# Patient Record
(demographics unavailable — no encounter records)

---

## 2025-02-07 NOTE — DISCUSSION/SUMMARY
[de-identified] : The underlying pathophysiology was reviewed in great detail with the patient as well as the various treatment options, including ice, analgesics, NSAIDs, Physical therapy, steroid injections, MRI, ACL reconstruction surgery  Patient opted for an aspiration today. Patient underwent aspiration to the RIGHT knee was provided today in the office for symptomatic relief. Patient tolerated procedure well.   An MRI of the RIGHT knee was ordered to rule out ACL tear.  Activity modifications and restrictions were discussed. I advised avoiding deep bending, squatting and high intensity activity.  A home exercise sheet was given and discussed with the patient to follow.   I have recommended utilizing a knee sleeve to provide added support and stability.   FU once MRI is obtained.   All questions were answered, all alternatives discussed and the patient is in complete agreement with that plan. Follow-up appointment as instructed. Any issues and the patient will call or come in sooner.

## 2025-02-07 NOTE — PROCEDURE
[de-identified] : PROCEDURE: Aspiration  INDICATION: Effusion to the right knee  At this point I recommended aspiration and under sterile precautions 50 cc bloody fluid was aspirated from the RIGHT knee without complication and after several minutes the patient felt significant relief. An ace bandage wrap was applied. Patient tolerated the procedure well.

## 2025-02-07 NOTE — ADDENDUM
[FreeTextEntry1] : I, Shana Delgadooksanaamy, acted solely as a scribe for Dr. Alexander Meyer on this date 02/07/2025.   All medical record entries made by the Scribe were at my, Dr. Alexander Meyer, direction and personally dictated by me on 02/07/2025. I have reviewed the chart and agree that the record accurately reflects my personal performance of the history, physical exam, assessment and plan. I have also personally directed, reviewed, and agreed with the chart.

## 2025-02-07 NOTE — PHYSICAL EXAM
[Crutches] : ambulates with crutches [Normal RLE] : Right Lower Extremity: No scars, rashes, lesions, ulcers, skin intact [Normal LLE] : Left Lower Extremity: No scars, rashes, lesions, ulcers, skin intact [Normal Touch] : sensation intact for touch [Normal] : no peripheral adenopathy appreciated [de-identified] : Right Lower Extremity o Knee :  Inspection/Palpation : 2+ effusion, mild lateral tenderness, no medial jointline tenderness, no deformity  Range of Motion : 20-60 degrees,   Stability : no valgus or varus instability present on provocative testing, Lachmans Test (2+)  Strength : not assessed due to injury o Muscle Bulk : normal muscle bulk present o Skin : no erythema, no ecchymosis o Sensation : sensation to pin intact o Vascular Exam : no edema, no cyanosis, dorsalis pedis artery pulse 2+, posterior tibial artery pulse 2+ [de-identified] : o RIGHT Knee : AP, lateral, sunrise, and Solano views of the knee were obtained, there are no soft tissue abnormalities, no fractures, alignment is normal, normal appearing joint spaces, normal bone density, no bony lesions.

## 2025-02-12 NOTE — PHYSICAL EXAM
[de-identified] : General appearance: well-nourished and hydrated, pleasant, alert and oriented x 3, cooperative. HEENT: Normocephalic, EOM intact, Nasal septum midline, Oral cavity clear, External auditory canal clear. Cardiovascular: no apparent abnormalities, no lower leg edema, no varicosities, pedal pulses are palpable. Lymphatics Lymph nodes: none palpated, Lymphedema: not present. Neurologic: sensation is normal, no muscle weakness in upper or lower extremities, patella tendon reflexes intact. Dermatologic no apparent skin lesions, moist, warm, no rash. Spine: cervical spine appears normal and moves freely, thoracic spine appears normal and moves freely, lumbosacral spine appears normal and moves freely. Gait: right antalgic.   Right knee Inspection: moderate effusion. Wounds: none. Alignment: normal. Palpation: no specific tenderness on palpation. ROM active (in degrees): 20 degree flexion contracture, 20-80 with pain and crepitus Ligamentous laxity: all ligaments appear stable,, positive ant. drawer test, negative post. drawer test, stable to varus stress test, stable to valgus stress test. positive Lachman's test, negative pivot shift test, mild medial lateral laxity  Meniscal Test: negative McMurrays, negative Samina. Patellofemoral Alignment Test: Q angle-, normal. Muscle Test: good quad strength. Leg examination: calf is soft and non-tender. AV malformation on medial aspect of thigh   Left knee Inspection: no effusion or erythema. Wounds: none. Alignment: normal. Palpation: no specific tenderness on palpation. ROM active (in degrees): 0-145 Ligamentous laxity: all ligaments appear stable, negative ant. drawer test, negative post. drawer test, stable to varus stress test, stable to valgus stress test. negative Lachman's test, negative pivot shift test Meniscal Test: negative McMurrays, negative Samina. Patellofemoral Alignment Test: Q angle-, normal. Muscle Test: good quad strength. Leg examination: calf soft and non tender.   Left hip Inspection: No swelling or ecchymosis. Wounds: none. Palpation: non-tender. Stability: no instability. Strength: 5/5 all motor groups. ROM: no pain with FROM. Leg length: equal.   Right hip Inspection: No swelling or ecchymosis. Wounds: none. Palpation: non-tender. Stability: no instability. Strength: 5/5 all motor groups. ROM: no pain with FROM. Leg length: equal.   Left ankle Inspection: no erythema noted, no swelling noted. Palpation: no pain on palpation. ROM: FROM without crepitus. Muscle strength: 5/5. Stability: no instability noted.   Right ankle Inspection: no erythema noted, no swelling noted. ROM: FROM without crepitus. Palpation: no pain on palpation. Muscle strength: 5/5. Stability: no instability noted.   Left foot Inspection: color, texture and turgor are normal. ROM: full range of motion of all joints without pain or crepitus. Palpation: no tenderness. Stability: no instability noted.   Right foot Inspection: color, texture and turgor are normal. ROM: full range of motion of all joints without pain or crepitus. Palpation: no tenderness. Stability: no instability noted.   Left shoulder Inspection: no muscle asymmetry, no atrophy. Palpation: no tenderness noted, ACJ non-tender. ROM: full active ROM, full passive ROM. Strength testing): anterior deltoid, supraspinatus, infraspinatus, subscapularis all 5/5. Stability test: ant. apprehension negative, post. apprehension negative, relocation test negative. Impingement Test: negative NEER.   Right shoulder Inspection: no muscle asymmetry, no atrophy. Palpation: no tenderness noted, ACJ non-tender. ROM: full active ROM, full passive ROM. Strength testing): anterior deltoid, supraspinatus, infraspinatus, subscapularis all 5/5. Stability test: ant. apprehension negative, post. apprehension negative, relocation test negative. Impingement Test: negative NEER. Surgical Wounds: none.   Left elbow Inspection: negative swelling. Wounds: none. Palpation: non-tender. ROM: full ROM. Strength: 5/5 all groups. Stability: no instability. Mass: none.   Right elbow Inspection: negative swelling. Wounds: none. Palpation: non-tender. ROM: full ROM. Strength: 5/5 all groups. Stability: no instability. Mass: none.   Left wrist Inspection: negative swelling. Wound: none. Palpation (bone): no tenderness. ROM: full ROM. Strength: full , good.   Right wrist Inspection: negative swelling. Wound: none. Palpation (bone): no tenderness. ROM: full ROM. Strength: full , good.   Left hand Inspection: no skin changes, normal appearance. Wounds: none. Strength: full , able to make full fist. Sensation: light touch intact all fingers and thumb. Vascular: good capillary refill < 3 seconds, all fingers and thumb. Mass: none.   Right hand Inspection: no skin changes, normal appearance. Wounds: none. Strength: full , able to make full fist. Sensation: light touch intact all fingers and thumb. Vascular: good capillary refill < 3 seconds, all fingers and thumb. Mass: none. [de-identified] : X-rays right knee taken 2/7/2025 brought in by patient shows normal alignment, joint space maintained and a well centered patella.   MRI right knee taken 2/10/2025: films brought in and reviewed, see attached report. Findings consisted with torn ACL, bone lateral tibial plateau, bony edema medial tibial plateau and bruising of posterior horn of meniscus. Bony edema noted.

## 2025-02-12 NOTE — DISCUSSION/SUMMARY
[de-identified] : Patient presents with a right knee acute ACL tear, MCL sprain and associated bone bruise. We discussed at length the nature of the patient's condition.  I did review MRI films with them. I believe he will eventually need an ACL reconstruction, but he does have limited ROM. In the interim, I suggested he start PT, hinge knee brace and crutches for ambulation. I did explain that he will need to regain him ROM before surgery could be considered. I referred him to Dr. Celeste for a surgical evaluation.   I will see them back on an as needed basis.

## 2025-02-12 NOTE — HISTORY OF PRESENT ILLNESS
[de-identified] : NOLAN SWENSON 44 year old male presents for evaluation of right knee. 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 He tried to stand up but unable to so due to the pain and instability. He saw a sports medicine doctor who gave him a Hinge brace and sent him for an MRI. He is having continued diffused pain in the knee. Patient notes swelling, feelings of instability and painful ROM. His walking is severely limited due to pain and stairs. He doesn't take oral pain medications regularly. He states he took Tylenol once or twice. 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 Otherwise, his right knee symptoms in the past. He denies any major medical problems. He has allergies to AVELOX (hives). He does have physically demanding job and does like to participate in high energy activities including sports.

## 2025-02-12 NOTE — ADDENDUM
[FreeTextEntry1] : This note was written by Temo Mckinley on 02/12/2025 acting as scribe for Dr. Magdi Burnette M.D.   I, Dr. Magdi Burnette, have read and attest that all the information, medical decision making and discharge instructions within are true and accurate.

## 2025-02-24 NOTE — DATA REVIEWED
[MRI] : MRI [Right] : of the right [Knee] : knee [Report was reviewed and noted in the chart] : The report was reviewed and noted in the chart [I independently reviewed and interpreted images and report] : I independently reviewed and interpreted images and report [I reviewed the films/CD and additionally noted] : I reviewed the films/CD and additionally noted [FreeTextEntry1] : MRI right knee reveals evidence of complete anterior cruciate ligament tearing with associated bone contusions, small posterior radial medial meniscal tearing, complex lateral meniscus tear, anterolateral ligament sprain

## 2025-02-24 NOTE — HISTORY OF PRESENT ILLNESS
[de-identified] : The patient is a 44 year  old right hand dominant male who presents today complaining of right knee pain.  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 This is NOT an athletic injury occurring associated with an interscholastic or organized sports team. Quality of symptoms: instability, buckling, swollen  Improves with: rest Worse with: stairs  Prior treatment: Aspiration Dr. 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

## 2025-02-24 NOTE — IMAGING
[de-identified] : The patient is a well appearing 44-year-old male of their stated age. Patient ambulates in soft knee brace. Negative straight leg raise bilateral.   Effected Knee: RIGHT  ROM:  0-130 degrees Lachman: POSITIVE  Pivot Shift: POSITIVE  Anterior Drawer: POSITIVE  Posterior Drawer / Sag: Negative Varus Stress 0 degrees: Stable Varus Stress 30 degrees: Stable Valgus Stress 0 degrees: Stable Valgus Stress 30 degrees: Stable Medial Wolf: POSITIVE  Lateral Wolf: POSITIVE  Patella Glide: 2+ Patella Apprehension: Negative Patella Grind: Negative [place] Valgus: Negative Pes Cavus: Negative   Palpation: Medial Joint Line: TENDER  Lateral Joint Line: TENDER  Medial Collateral Ligament: Nontender Lateral Collateral Ligament/PLC: Nontender Distal Femur: Nontender Proximal Tibia: Nontender Tibial Tubercle: Nontender Gerdy's Tubercle: Nontender Distal Pole Patella: Nontender Quadriceps Tendon: Nontender & Intact Patella Tendon: Nontender & Intact Medial Femoral Condyle: Nontender Medial Distal Hamstring/PES: Nontender Lateral Distal Hamstring: Nontender & Stable Iliotibial Band: Nontender Medial Patellofemoral Ligament: Nontender Adductor: Nontender Proximal GSC-Plantaris: Nontender Calf: Supple & Nontender   Inspection: Deformity: No Erythema: No Ecchymosis: No Abrasions: No Effusion: MILD  Prepatellar Bursitis: No Neurologic Exam: Sensation L4-S1: Grossly Intact Motor Exam: Quadriceps: 4 out of 5 Hamstrings: 5 out of 5 EHL: 5 out of 5 FHL: 5 out of 5 TA: 5 out of 5 GS: 5 out of 5 Circulatory/Pulses: Dorsalis Pedis: 2+ Posterior Tibialis: 2+ Additional Pertinent Findings: None   Contralateral Knee:                           ROM: 0-145 degrees Other Pertinent Findings: None   Assessment: The patient is a 44 year old male with right knee pain and radiographic and physical exam findings consistent with ACL tear, ALL sprain, medial and lateral meniscus tears, possible medial root. The patient's condition is acute. Documents/Results Reviewed Today: MRI right knee  Tests/Studies Independently Interpreted Today: MRI right knee reveals evidence of complete anterior cruciate ligament tearing with associated bone contusions, small posterior radial medial meniscal tearing, complex lateral meniscus tear, anterolateral ligament sprain.  Pertinent findings include: 0-130, mild effusion, 4/5 quad strength, LJLT, MJLT, +medial wolf, +lateral wolf, +LM/PS/AD, stable collateral ligaments.  Confounding medical conditions/concerns: vascular malformation right inner thigh - two sclerotherapy surgeries at \[facility]\"" last in 2023.    Plan: Discussed operative vs non-operative treatment options including right knee arthroscopic anterior cruciate ligament reconstruction with quadriceps tendon autograft with internal brace, medial and lateral meniscal repairs vs meniscectomy, anterolateral ligament repair, and any indicated procedures. Recommended the patient proceed with the suggested surgical procedure to improve the functionality of the affected joint. All questions and concerns regarding the surgery were addressed. Went over the recovery timeline and expected outcomes following surgery. The patient continues to be symptomatic and has failed conservative treatment, deciding to move forward with surgical intervention. The patient will consult his vascular surgeon regarding use of a tourniquet given his history of vascular malformation right inner thigh.  Tests Ordered: Pre-op Prescription Medications Ordered: Discussed appropriate use of OTC anti-inflammatories and analgesics (including but not limited to Aleve, Advil, Tylenol, Motrin, Ibuprofen, Voltaren gel, etc.) Braces/DME Ordered: XROM, reparel, and cold therapy unit  Activity/Work/Sports Status: No dynamic pivoting and cutting  Additional Instructions: None Follow-Up: 2 weeks post-op  The patient's current medication management of their orthopedic diagnosis was reviewed today: The patient declined and/or was contraindicated for the recommended prescription medication Naprosyn and will use over the counter Advil, Aleve, Voltaren Gel or Tylenol as directed. (1) We discussed a comprehensive treatment plan that included possible pharmaceutical management involving the use of prescription strength medications including but not limited to options such as oral Naprosyn 500mg BID, once daily Meloxicam 15 mg, or 500-650 mg Tylenol versus over the counter oral medications and topical prescription NSAID Pennsaid vs over the counter Voltaren gel.  Based on our extensive discussion, the patient declined prescription medication and will use over the counter Advil, Alleve, Voltaren Gel or Tylenol as directed. (2) There is a moderate risk of morbidity with further treatment, especially from use of prescription strength medications and possible side effects of these medications which include upset stomach with oral medications, skin reactions to topical medications and cardiac/renal issues with long term use. (3) I recommended that the patient follow-up with their medical physician to discuss any significant specific potential issues with long term medication use such as interactions with current medications or with exacerbation of underlying medical comorbidities. (4) The benefits and risks associated with use of injectable, oral or topical, prescription and over the counter anti-inflammatory medications were discussed with the patient. The patient voiced understanding of the risks including but not limited to bleeding, stroke, kidney dysfunction, heart disease, and were referred to the black box warning label for further information.   Consent:  Conservative treatment, nontreatment, nonsurgical intervention and surgical intervention treatment options have been reviewed with the patient.  The patient continues to be symptomatic and has failed conservative treatment, and elects to move forward with surgical intervention.  The patient is indicated for right knee arthroscopic assisted anterior cruciate ligament reconstruction with quadriceps tendon autograft with internal brace, anterolateral ligament repair, medial and lateral meniscus repair vs meniscectomy and all indicated procedures. As such the alternatives, benefits and risks, of the above procedure, including but not limited to bleeding, infection, neurovascular injury, loss of limb, loss of life,  DVT, PE, RSD, inability to return to previous level of activity, inability to return to previous level of employment, advancement of or to osteoarthritic changes, joint instability or motion loss, hardware failure or migration, failure to resolve all symptoms, failure to return to sports and need for further procedures, as well as specific risk of meniscus repair failure, anterior knee pain and patella fracture, and need for future joint arthroplasty were discussed with the patient and/or their legal guardian who agreed to move forward with surgical intervention.  They have reviewed and signed the consent form today after expressing understanding of the above documented conversation. The patient or their representative will contact my office as instructed on the preoperative instruction sheet they received today to schedule surgery in a timely manner as discussed.   IMarlyn attest that this documentation has been prepared under the direction and in the presence of Provider Dr. Jerardo Amezquita.   The documentation recorded by the scribe accurately reflects the services IDr. Jerardo, personally performed and the decisions made by me.

## 2025-02-25 NOTE — REVIEW OF SYSTEMS
Goal Outcome Evaluation:    Plan of Care Reviewed With: patient    Overall Patient Progress: no change    Outcome Evaluation: Pt has been sleeping /resting well tonight until around 0530 when pt called and RN came to his room. He requested to empty his urinal and to get him ice water which RN did for him. When asked about his pain, he told RN not to worry about it. Before RN left his room, pt requested to leave his door half way open. After 30 minutes, this RN was informed by 2 other RNs that pt was very upset and started to yell at them. Support staff had told them that pt needs help with WIFI so they went to the pt;s room and had woken up the pt which makes him very mad since he claimed not needing anything and yet he was woken up from sleep. The Support staff then admitted giving the wrong room number to the 2 other RNs which she apologized for but pt  remained upset d/t multiple staff  and care issues he already had. Pt remained calm and pleasant with this RN during encounters but also not hiding his frustration to cares received in the unit.Pt has no c/o SOB and no s/s of respiratory issue noted at RA. Appear to be sleeping/resting between cares/ meds. Continue with plan of care.    Patient's most recent vital signs are:     Vital signs:  BP: 121/54  Temp: 98.3  HR: 66  RR: 17  SpO2: 94 %     Patient does not have new respiratory symptoms.  Patient does not have new sore throat.  Patient does not have a fever greater than 99.5.             [Joint Pain] : joint pain [Negative] : Heme/Lymph

## 2025-03-03 NOTE — HISTORY OF PRESENT ILLNESS
[FreeTextEntry1] : Medical clearance [de-identified] : Patient encounter today for medical clearance requested by  for right knee arthroscopy scheduled on 03/04/2025 at [facility]. States they have been doing well.  Denies any CP, SOB or diff breathing.  No recent fever, chills, cough or cold type symptoms. No recent LOC or head trauma. Denies a history of seizure disorder. Denies lose bridges, caps, or dentures. Denies complications with anesthesia in the past. Patient had a physical within the last year.  Has no other complaints at this time.

## 2025-03-03 NOTE — HISTORY OF PRESENT ILLNESS
[FreeTextEntry1] : Medical clearance [de-identified] : Patient encounter today for medical clearance requested by  for right knee arthroscopy scheduled on 03/04/2025 at [facility]. States they have been doing well.  Denies any CP, SOB or diff breathing.  No recent fever, chills, cough or cold type symptoms. No recent LOC or head trauma. Denies a history of seizure disorder. Denies lose bridges, caps, or dentures. Denies complications with anesthesia in the past. Patient had a physical within the last year.  Has no other complaints at this time.

## 2025-03-03 NOTE — PLAN
[FreeTextEntry1] : Labs Drawn by Dr. Anand Baez due to poor venous access.  Patient required lab testing due to conditions in their past medical history requiring periodic monitoring.  Labs were sent to [facility].  EKG - NSR - 70, MARITZA - 0.142, No acute T wave changes noted..  Labs reviewed  Patient to continue with present medications - all medications reconciled/reviewed during this visit and listed above. Increase fluids  Patient is medically stable and may proceed with proposed procedure.  This clearance has been forwarded to Dr. Amezquita and his office has been contacted providing medical clearance and all associated documents for this patient.  At least  45 minutes was spent with patient face to face examining and reviewing findings/results during this visit. Ample time was provided to answer any questions or address concerns to the patients satisfaction..   I, Martha Baker, attest that this documentation has been prepared under the direction and in the presence of Provider Anand Baez DNP  The documentation recorded by the scribe, in my presence, accurately reflects the service I personally performed, and the decisions made by me with my edits as appropriate. Anand Baez DNP

## 2025-03-03 NOTE — PLAN
[FreeTextEntry1] : Labs Drawn by Dr. Anand Baez due to poor venous access.  Patient required lab testing due to conditions in their past medical history requiring periodic monitoring.  Labs were sent to [facility].  EKG - NSR - 70, MARITZA - 0.142, No acute T wave changes noted..  Labs reviewed  Patient to continue with present medications - all medications reconciled/reviewed during this visit and listed above. Increase fluids  Patient is medically stable and may proceed with proposed procedure.  This clearance has been forwarded to Dr. Amezquita and his office has been contacted providing medical clearance and all associated documents for this patient.  At least  45 minutes was spent with patient face to face examining and reviewing findings/results during this visit. Ample time was provided to answer any questions or address concerns to the patients satisfaction..   I, Martha Baker, attest that this documentation has been prepared under the direction and in the presence of Provider Anand Baez DNP  The documentation recorded by the scribe, in my presence, accurately reflects the service I personally performed, and the decisions made by me with my edits as appropriate. Anand Baez DNP

## 2025-03-18 NOTE — PHYSICAL EXAM
[de-identified] : The patient is a well appearing 44 year old male of their stated age. Patient ambulates in XROM with assistance of crutches. Negative straight leg raise bilateral.   Surgical site: Right knee    Incision sites: Well approximated, clean, dry, intact, without drainage, without erythema   Range of motion: 0-80 degrees    Motor Testing: Quad firing well, able to perform SLR.    Stability Testing: Limited by pain   Swelling/Effusion/Ecchymosis: Mild effusion   Tenderness to palpation: None   Provocative testing: Limited by pain   Right Calf: soft and nontender Left Calf: soft and nontender   Neurovascular Examination: Grossly intact, 2+ distal pulses Contralateral Extremity: Examination grossly benign   Assessment & Plan: The patient is approximately 2 weeks s/p right knee EUA arthroscopic anterior cruciate ligament reconstruction with quadriceps autograft with internal brace, anterolateral ligament repair, partial lateral meniscectomy, synovectomy and excision of plica (DOS: 3/4/25). Sutures removed and Steri Strips applied today. The patient is instructed on wound management. Reviewed and discussed operative images with patient in office today.  The patient's post-op plan, protocol and activity modifications have been thoroughly discussed and the patient expressed understanding. Patient will continue use of brace as discussed, locked in extension opening as per protocol. Continue physical therapy. The patient will control pain as discussed & continue ice and elevation as needed. The patient otherwise may advance activity as discussed. Follow up 4 weeks.   The patient's current medication management of their orthopedic diagnosis was reviewed today: The patient declined and/or was contraindicated for the recommended prescription medication Naprosyn and will use over the counter Advil, Aleve, Voltaren Gel or Tylenol as directed. (1) We discussed a comprehensive treatment plan that included possible pharmaceutical management involving the use of prescription strength medications including but not limited to options such as oral Naprosyn 500mg BID, once daily Meloxicam 15 mg, or 500-650 mg Tylenol versus over the counter oral medications and topical prescription NSAID Pennsaid vs over the counter Voltaren gel.  Based on our extensive discussion, the patient declined prescription medication and will use over the counter Advil, Alleve, Voltaren Gel or Tylenol as directed. (2) There is a moderate risk of morbidity with further treatment, especially from use of prescription strength medications and possible side effects of these medications which include upset stomach with oral medications, skin reactions to topical medications and cardiac/renal issues with long term use. (3) I recommended that the patient follow-up with their medical physician to discuss any significant specific potential issues with long term medication use such as interactions with current medications or with exacerbation of underlying medical comorbidities. (4) The benefits and risks associated with use of injectable, oral or topical, prescription and over the counter anti-inflammatory medications were discussed with the patient. The patient voiced understanding of the risks including but not limited to bleeding, stroke, kidney dysfunction, heart disease, and were referred to the black box warning label for further information.   Marlyn CAMARGO attest that this documentation has been prepared under the direction and in the presence of Provider Dr. Jerardo Amezquita.   The documentation recorded by the scribe accurately reflects the service Bel CAMARGO PA-C, personally performed and the decisions made by me. The patient was seen by me under the direct supervision of Dr. Jerardo Amezquita.

## 2025-03-18 NOTE — HISTORY OF PRESENT ILLNESS
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 This is NOT an athletic injury occurring associated with an interscholastic or organized sports team. 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 Denies fever/chills/nausea after surgery. Compliant with xrom. In PT 2x/week and doing HEP. Doing well postoperatively.  Additional Information: vascular malformation right inner thigh - two sclerotherapy surgeries at [facility] last one 2023

## 2025-03-18 NOTE — HISTORY OF PRESENT ILLNESS
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 This is NOT an athletic injury occurring associated with an interscholastic or organized sports team. 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 Denies fever/chills/nausea after surgery. Compliant with xrom. In PT 2x/week and doing HEP. Doing well postoperatively.  Additional Information: vascular malformation right inner thigh - two sclerotherapy surgeries at \[facility]\"" last one 2023

## 2025-03-18 NOTE — PHYSICAL EXAM
[de-identified] : The patient is a well appearing 44 year old male of their stated age. Patient ambulates in XROM with assistance of crutches. Negative straight leg raise bilateral.   Surgical site: Right knee    Incision sites: Well approximated, clean, dry, intact, without drainage, without erythema   Range of motion: 0-80 degrees    Motor Testing: Quad firing well, able to perform SLR.    Stability Testing: Limited by pain   Swelling/Effusion/Ecchymosis: Mild effusion   Tenderness to palpation: None   Provocative testing: Limited by pain   Right Calf: soft and nontender Left Calf: soft and nontender   Neurovascular Examination: Grossly intact, 2+ distal pulses Contralateral Extremity: Examination grossly benign   Assessment & Plan: The patient is approximately 2 weeks s/p right knee EUA arthroscopic anterior cruciate ligament reconstruction with quadriceps autograft with internal brace, anterolateral ligament repair, partial lateral meniscectomy, synovectomy and excision of plica (DOS: 3/4/25). Sutures removed and Steri Strips applied today. The patient is instructed on wound management. Reviewed and discussed operative images with patient in office today.  The patient's post-op plan, protocol and activity modifications have been thoroughly discussed and the patient expressed understanding. Patient will continue use of brace as discussed, locked in extension opening as per protocol. Continue physical therapy. The patient will control pain as discussed & continue ice and elevation as needed. The patient otherwise may advance activity as discussed. Follow up 4 weeks.   The patient's current medication management of their orthopedic diagnosis was reviewed today: The patient declined and/or was contraindicated for the recommended prescription medication Naprosyn and will use over the counter Advil, Aleve, Voltaren Gel or Tylenol as directed. (1) We discussed a comprehensive treatment plan that included possible pharmaceutical management involving the use of prescription strength medications including but not limited to options such as oral Naprosyn 500mg BID, once daily Meloxicam 15 mg, or 500-650 mg Tylenol versus over the counter oral medications and topical prescription NSAID Pennsaid vs over the counter Voltaren gel.  Based on our extensive discussion, the patient declined prescription medication and will use over the counter Advil, Alleve, Voltaren Gel or Tylenol as directed. (2) There is a moderate risk of morbidity with further treatment, especially from use of prescription strength medications and possible side effects of these medications which include upset stomach with oral medications, skin reactions to topical medications and cardiac/renal issues with long term use. (3) I recommended that the patient follow-up with their medical physician to discuss any significant specific potential issues with long term medication use such as interactions with current medications or with exacerbation of underlying medical comorbidities. (4) The benefits and risks associated with use of injectable, oral or topical, prescription and over the counter anti-inflammatory medications were discussed with the patient. The patient voiced understanding of the risks including but not limited to bleeding, stroke, kidney dysfunction, heart disease, and were referred to the black box warning label for further information.   Marlyn CAMARGO attest that this documentation has been prepared under the direction and in the presence of Provider Dr. Jerardo Amezquita.   The documentation recorded by the scribe accurately reflects the service Bel CAMARGO PA-C, personally performed and the decisions made by me. The patient was seen by me under the direct supervision of Dr. Jerardo Amezquita.

## 2025-04-15 NOTE — HISTORY OF PRESENT ILLNESS
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 This is NOT an athletic injury occurring associated with an interscholastic or organized sports team. Treatment/Imaging/Studies Since Last Visit: PT 2x/week 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 Getting to about 95 degrees of knee flexion. Unlocked his brace for walking on thursday 4/10. Quad strength is improving. No pain with activity, soreness after PT. Additional Information: vascular malformation right inner thigh - two sclerotherapy surgeries [facility] last one 2023

## 2025-04-15 NOTE — PHYSICAL EXAM
[de-identified] : The patient is a well appearing 44 year old male of their stated age. Patient ambulates in XROM with assistance of crutches. Negative straight leg raise bilateral.   Surgical site: Right knee    Incision sites: Well Healed   Range of motion: 3-90 degrees    Motor Testin/5 Quadriceps   Stability Testing: Limited by pain   Swelling/Effusion/Ecchymosis: Mild effusion   Tenderness to palpation: None   Provocative testing: Limited by pain   Right Calf: soft and nontender Left Calf: soft and nontender   Neurovascular Examination: Grossly intact, 2+ distal pulses Contralateral Extremity: Examination grossly benign   Assessment & Plan: The patient is approximately 6 weeks s/p right knee EUA arthroscopic anterior cruciate ligament reconstruction with quadriceps autograft with internal brace, anterolateral ligament repair, partial lateral meniscectomy, synovectomy and excision of plica (DOS: 3/4/25). The patient is instructed on wound management.  The patient's post-op plan, protocol and activity modifications have been thoroughly discussed and the patient expressed understanding. Patient may discontinue use of brace - unless in heavily populated environments or bad weather conditions. The patient presents with limited flexion. The patient is prescribed a Flexion Dynasplint Knee Brace today as an adjunct to formal physical therapy due to a delay in post-operative flexion in the involved knee. Continue physical therapy working on pushing ROM. Discussed putting something under his heel to help gain full extension. The patient will control pain as discussed & continue ice and elevation as needed. The patient otherwise may advance activity as discussed. Recommended mobilizing patella and instructed patient how to do patella mobilization. Follow up 6 weeks.    Letter of Medical Necessity for Flexion Dynasplint Knee Brace The patient is prescribed a Flexion Dynasplint Knee Brace today as an adjunct to formal physical therapy due to a delay in post-operative flexion in the involved knee. At this time, the patient presents with documented signs and symptoms of significant motion loss following surgical procedure. This brace is indicated to increase range of motion of the joint by applying steady, gentle force to the joint over a prolonged period of time and to avoid any manipulation under anesthesia.   The patient's current medication management of their orthopedic diagnosis was reviewed today: The patient declined and/or was contraindicated for the recommended prescription medication Naprosyn and will use over the counter Advil, Aleve, Voltaren Gel or Tylenol as directed. (1) We discussed a comprehensive treatment plan that included possible pharmaceutical management involving the use of prescription strength medications including but not limited to options such as oral Naprosyn 500mg BID, once daily Meloxicam 15 mg, or 500-650 mg Tylenol versus over the counter oral medications and topical prescription NSAID Pennsaid vs over the counter Voltaren gel.  Based on our extensive discussion, the patient declined prescription medication and will use over the counter Advil, Alleve, Voltaren Gel or Tylenol as directed. (2) There is a moderate risk of morbidity with further treatment, especially from use of prescription strength medications and possible side effects of these medications which include upset stomach with oral medications, skin reactions to topical medications and cardiac/renal issues with long term use. (3) I recommended that the patient follow-up with their medical physician to discuss any significant specific potential issues with long term medication use such as interactions with current medications or with exacerbation of underlying medical comorbidities. (4) The benefits and risks associated with use of injectable, oral or topical, prescription and over the counter anti-inflammatory medications were discussed with the patient. The patient voiced understanding of the risks including but not limited to bleeding, stroke, kidney dysfunction, heart disease, and were referred to the black box warning label for further information.   Lynne CAMARGO attest that this documentation has been prepared under the direction and in the presence of Provider Dr. Jerardo Amezquita.  The documentation recorded by the scribe accurately reflects the services IDr. Jerardo, personally performed and the decisions made by me.

## 2025-04-15 NOTE — PHYSICAL EXAM
[de-identified] : The patient is a well appearing 44 year old male of their stated age. Patient ambulates in XROM with assistance of crutches. Negative straight leg raise bilateral.   Surgical site: Right knee    Incision sites: Well Healed   Range of motion: 3-90 degrees    Motor Testin/5 Quadriceps   Stability Testing: Limited by pain   Swelling/Effusion/Ecchymosis: Mild effusion   Tenderness to palpation: None   Provocative testing: Limited by pain   Right Calf: soft and nontender Left Calf: soft and nontender   Neurovascular Examination: Grossly intact, 2+ distal pulses Contralateral Extremity: Examination grossly benign   Assessment & Plan: The patient is approximately 6 weeks s/p right knee EUA arthroscopic anterior cruciate ligament reconstruction with quadriceps autograft with internal brace, anterolateral ligament repair, partial lateral meniscectomy, synovectomy and excision of plica (DOS: 3/4/25). The patient is instructed on wound management.  The patient's post-op plan, protocol and activity modifications have been thoroughly discussed and the patient expressed understanding. Patient may discontinue use of brace - unless in heavily populated environments or bad weather conditions. The patient presents with limited flexion. The patient is prescribed a Flexion Dynasplint Knee Brace today as an adjunct to formal physical therapy due to a delay in post-operative flexion in the involved knee. Continue physical therapy working on pushing ROM. Discussed putting something under his heel to help gain full extension. The patient will control pain as discussed & continue ice and elevation as needed. The patient otherwise may advance activity as discussed. Recommended mobilizing patella and instructed patient how to do patella mobilization. Follow up 6 weeks.    Letter of Medical Necessity for Flexion Dynasplint Knee Brace The patient is prescribed a Flexion Dynasplint Knee Brace today as an adjunct to formal physical therapy due to a delay in post-operative flexion in the involved knee. At this time, the patient presents with documented signs and symptoms of significant motion loss following surgical procedure. This brace is indicated to increase range of motion of the joint by applying steady, gentle force to the joint over a prolonged period of time and to avoid any manipulation under anesthesia.   The patient's current medication management of their orthopedic diagnosis was reviewed today: The patient declined and/or was contraindicated for the recommended prescription medication Naprosyn and will use over the counter Advil, Aleve, Voltaren Gel or Tylenol as directed. (1) We discussed a comprehensive treatment plan that included possible pharmaceutical management involving the use of prescription strength medications including but not limited to options such as oral Naprosyn 500mg BID, once daily Meloxicam 15 mg, or 500-650 mg Tylenol versus over the counter oral medications and topical prescription NSAID Pennsaid vs over the counter Voltaren gel.  Based on our extensive discussion, the patient declined prescription medication and will use over the counter Advil, Alleve, Voltaren Gel or Tylenol as directed. (2) There is a moderate risk of morbidity with further treatment, especially from use of prescription strength medications and possible side effects of these medications which include upset stomach with oral medications, skin reactions to topical medications and cardiac/renal issues with long term use. (3) I recommended that the patient follow-up with their medical physician to discuss any significant specific potential issues with long term medication use such as interactions with current medications or with exacerbation of underlying medical comorbidities. (4) The benefits and risks associated with use of injectable, oral or topical, prescription and over the counter anti-inflammatory medications were discussed with the patient. The patient voiced understanding of the risks including but not limited to bleeding, stroke, kidney dysfunction, heart disease, and were referred to the black box warning label for further information.   Lynne CAMARGO attest that this documentation has been prepared under the direction and in the presence of Provider Dr. Jerardo Amezquita.  The documentation recorded by the scribe accurately reflects the services IDr. Jerardo, personally performed and the decisions made by me.

## 2025-04-15 NOTE — HISTORY OF PRESENT ILLNESS
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 This is NOT an athletic injury occurring associated with an interscholastic or organized sports team. 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 Getting to about 95 degrees of knee flexion. Unlocked his brace for walking on thursday 4/10. Quad strength is improving. No pain with activity, soreness after PT. Additional Information: vascular malformation right inner thigh - two sclerotherapy surgeries at [facility] last one 2023

## 2025-05-13 NOTE — HISTORY OF PRESENT ILLNESS
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 This is NOT an athletic injury occurring associated with an interscholastic or organized sports team. 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 Is having difficulty walking without a limp. Working on getting a normal gait in PT. Experiences some tightness discomfort in his anterior knee. Additional Information: vascular malformation right inner thigh - two sclerotherapy surgeries at [facility] last one 2023

## 2025-06-10 NOTE — PHYSICAL EXAM
[de-identified] : The patient is a well appearing 44-year-old male of their stated age. Patient ambulates with normal gait Negative straight leg raise bilateral.   Surgical site: Right knee    Incision sites: Well Healed   Range of motion: 0-140 degrees    Motor Testin+/5 Quadriceps   Stability Testing: Slight varus and valgus jog    Swelling/Effusion/Ecchymosis: None   Tenderness to palpation: None   Provocative testing: -LM/AD   Right Calf: soft and nontender Left Calf: soft and nontender   Neurovascular Examination: Grossly intact, 2+ distal pulses Contralateral Extremity: Examination grossly benign   Assessment & Plan: The patient is approximately 14 weeks s/p right knee EUA arthroscopic anterior cruciate ligament reconstruction with quadriceps autograft with internal brace, anterolateral ligament repair, partial lateral meniscectomy, synovectomy and excision of plica (DOS: 3/4/25). The patient's post-op plan, protocol and activity modifications have been thoroughly discussed and the patient expressed understanding. Continue physical therapy working on strengthening. The patient has progressed well over the past 6 weeks working on ROM and strengthening. The patient will control pain as discussed & continue ice and elevation as needed. The patient otherwise may advance activity as discussed. The patient is prescribed a custom [employer] ACL short calf [place] [employer] knee brace today for return to activities of daily living. The patient will start to advance with straight line activity on flat, stable ground. The patient will avoid any dynamic pivoting or cutting and continue to work on strengthening.  The patient is allowed to return to work on 6/10/25- note given today. Discussed with patient being cognizant at work and wearing the custom hinged brace while at work until he gets the custom brace  Follow up 6 weeks.  Letter of Medical Necessity for Custom Knee Brace   The patient is prescribed a custom [employer] ACL short calf [place] Pro knee brace today for return to activities of daily living. This brace is indicated to protect the surgically repaired knee due to instability during the continued ligamentous healing process, and while the patient increases their activities of daily living. At this point the patient presents with sustained muscle atrophy, proprioceptive deficiency, instability and motor imbalance in their involved knee. The custom nature of the brace is required to match the natural contours of this patient's thigh to calf ratio which would not fit into an off the shelf model due to surgical changes and thigh atrophy.  The patient's current medication management of their orthopedic diagnosis was reviewed today: The patient declined and/or was contraindicated for the recommended prescription medication Naprosyn and will use over the counter Advil, Aleve, Voltaren Gel or Tylenol as directed. (1) We discussed a comprehensive treatment plan that included possible pharmaceutical management involving the use of prescription strength medications including but not limited to options such as oral Naprosyn 500mg BID, once daily Meloxicam 15 mg, or 500-650 mg Tylenol versus over-the-counter oral medications and topical prescription NSAID Pennsaid vs over the counter Voltaren gel.  Based on our extensive discussion, the patient declined prescription medication and will use over the counter Advil, Aleve, Voltaren Gel or Tylenol as directed. (2) There is a moderate risk of morbidity with further treatment, especially from use of prescription strength medications and possible side effects of these medications which include upset stomach with oral medications, skin reactions to topical medications and cardiac/renal issues with long term use. (3) I recommended that the patient follow-up with their medical physician to discuss any significant specific potential issues with long term medication use such as interactions with current medications or with exacerbation of underlying medical comorbidities. (4) The benefits and risks associated with use of injectable, oral or topical, prescription and over the counter anti-inflammatory medications were discussed with the patient. The patient voiced understanding of the risks including but not limited to bleeding, stroke, kidney dysfunction, heart disease, and were referred to the black box warning label for further information.   Lynne CAMARGO attest that this documentation has been prepared under the direction and in the presence of Bel Perez PA-C.  The documentation recorded by the scribe accurately reflects the service Bel CAMARGO PA-C, personally performed and the decisions made by me.

## 2025-06-10 NOTE — HISTORY OF PRESENT ILLNESS
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 This is NOT an athletic injury occurring associated with an interscholastic or organized sports team. 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 Has not started running or jumping in physical therapy. Feels that his gait has improved and is able to walk without a limp.  Additional Information: s/p right knee EUA arthroscopic assisted ACLR c quad auto c IB, ALL repair, PLM, synovectomy and excision of plica.  vascular malformation right inner thigh - two sclerotherapy surgeries at [facility] last one 2023

## 2025-07-22 NOTE — HISTORY OF PRESENT ILLNESS
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 This is NOT an athletic injury occurring associated with an interscholastic or organized sports team. 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 Obtained custom knee brace but does not believe it fits properly - slides down. Did have [employer] rep take a look at the brace who believed it was fitting properly. Was in PT 2x/week then insurance stopped approving visits shortly after his last visit 6/12/25. Joined a gym which has helped with increasing his strength. Started jogging 1.5 weeks ago and reports increased crunching and anterior knee soreness. On 7/5/25 he rolled his left ankle so all of his weight went on the right knee and he felt something in his right knee tear. Returned to work 6/16/25.  Additional Information: s/p right knee EUA arthroscopic assisted ACLR c quad auto c IB, ALL repair, PLM, synovectomy and excision of plica.  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

## 2025-07-22 NOTE — PHYSICAL EXAM
[de-identified] : The patient is a well appearing 44-year-old male of their stated age. Patient ambulates with normal gait Negative straight leg raise bilateral.   Surgical site: Right knee    Incision sites: Well Healed   Range of motion: 0-140 degrees    Motor Testin+/5 Quadriceps   Stability Testing: Slight varus and valgus jog    Swelling/Effusion/Ecchymosis: None   Tenderness to palpation: None   Provocative testing: -LM/AD   Right Calf: soft and nontender Left Calf: soft and nontender   Neurovascular Examination: Grossly intact, 2+ distal pulses Contralateral Extremity: Examination grossly benign   Assessment & Plan: The patient is approximately 4.5 months s/p right knee EUA arthroscopic anterior cruciate ligament reconstruction with quadriceps autograft with internal brace, anterolateral ligament repair, partial lateral meniscectomy, synovectomy and excision of plica (DOS: 3/4/25). The patient's post-op plan, protocol and activity modifications have been thoroughly discussed and the patient expressed understanding. Patient will continue to work on HEP and stretching. The patient will control pain as discussed & continue ice and elevation as needed. The patient otherwise may advance activity as discussed. The patient has obtained his custom brace- he can begin to work on pivoting and cutting in his brace.  Again, discussed with patient being cognizant at work and wearing his custom brace. He is allowed to slowly increase his activity as tolerated otherwise has no activity restrictions. The patient will follow up on a PRN basis unless new symptoms arise.  The patient's current medication management of their orthopedic diagnosis was reviewed today: The patient declined and/or was contraindicated for the recommended prescription medication Naprosyn and will use over the counter Advil, Aleve, Voltaren Gel or Tylenol as directed. (1) We discussed a comprehensive treatment plan that included possible pharmaceutical management involving the use of prescription strength medications including but not limited to options such as oral Naprosyn 500mg BID, once daily Meloxicam 15 mg, or 500-650 mg Tylenol versus over-the-counter oral medications and topical prescription NSAID Pennsaid vs over the counter Voltaren gel.  Based on our extensive discussion, the patient declined prescription medication and will use over the counter Advil, Aleve, Voltaren Gel or Tylenol as directed. (2) There is a moderate risk of morbidity with further treatment, especially from use of prescription strength medications and possible side effects of these medications which include upset stomach with oral medications, skin reactions to topical medications and cardiac/renal issues with long term use. (3) I recommended that the patient follow-up with their medical physician to discuss any significant specific potential issues with long term medication use such as interactions with current medications or with exacerbation of underlying medical comorbidities. (4) The benefits and risks associated with use of injectable, oral or topical, prescription and over the counter anti-inflammatory medications were discussed with the patient. The patient voiced understanding of the risks including but not limited to bleeding, stroke, kidney dysfunction, heart disease, and were referred to the black box warning label for further information.   Lynne CAMARGO attest that this documentation has been prepared under the direction and in the presence of Provider Dr. Jerardo Amezquita.  The documentation recorded by the scribe accurately reflects the services Dr. Jerardo CAMARGO, personally performed and the decisions made by me.  
[de-identified] : The patient is a well appearing 44-year-old male of their stated age. Patient ambulates with normal gait Negative straight leg raise bilateral.   Surgical site: Right knee    Incision sites: Well Healed   Range of motion: 0-140 degrees    Motor Testin+/5 Quadriceps   Stability Testing: Slight varus and valgus jog    Swelling/Effusion/Ecchymosis: None   Tenderness to palpation: None   Provocative testing: -LM/AD   Right Calf: soft and nontender Left Calf: soft and nontender   Neurovascular Examination: Grossly intact, 2+ distal pulses Contralateral Extremity: Examination grossly benign   Assessment & Plan: The patient is approximately 4.5 months s/p right knee EUA arthroscopic anterior cruciate ligament reconstruction with quadriceps autograft with internal brace, anterolateral ligament repair, partial lateral meniscectomy, synovectomy and excision of plica (DOS: 3/4/25). The patient's post-op plan, protocol and activity modifications have been thoroughly discussed and the patient expressed understanding. Patient will continue to work on HEP and stretching. The patient will control pain as discussed & continue ice and elevation as needed. The patient otherwise may advance activity as discussed. The patient has obtained his custom brace- he can begin to work on pivoting and cutting in his brace.  Again, discussed with patient being cognizant at work and wearing his custom brace. He is allowed to slowly increase his activity as tolerated otherwise has no activity restrictions. The patient will follow up on a PRN basis unless new symptoms arise.  The patient's current medication management of their orthopedic diagnosis was reviewed today: The patient declined and/or was contraindicated for the recommended prescription medication Naprosyn and will use over the counter Advil, Aleve, Voltaren Gel or Tylenol as directed. (1) We discussed a comprehensive treatment plan that included possible pharmaceutical management involving the use of prescription strength medications including but not limited to options such as oral Naprosyn 500mg BID, once daily Meloxicam 15 mg, or 500-650 mg Tylenol versus over-the-counter oral medications and topical prescription NSAID Pennsaid vs over the counter Voltaren gel.  Based on our extensive discussion, the patient declined prescription medication and will use over the counter Advil, Aleve, Voltaren Gel or Tylenol as directed. (2) There is a moderate risk of morbidity with further treatment, especially from use of prescription strength medications and possible side effects of these medications which include upset stomach with oral medications, skin reactions to topical medications and cardiac/renal issues with long term use. (3) I recommended that the patient follow-up with their medical physician to discuss any significant specific potential issues with long term medication use such as interactions with current medications or with exacerbation of underlying medical comorbidities. (4) The benefits and risks associated with use of injectable, oral or topical, prescription and over the counter anti-inflammatory medications were discussed with the patient. The patient voiced understanding of the risks including but not limited to bleeding, stroke, kidney dysfunction, heart disease, and were referred to the black box warning label for further information.   Lynne CAMARGO attest that this documentation has been prepared under the direction and in the presence of Provider Dr. Jerardo Amezquita.  The documentation recorded by the scribe accurately reflects the services Dr. Jerardo CAMARGO, personally performed and the decisions made by me.  
detox

## 2025-07-22 NOTE — HISTORY OF PRESENT ILLNESS
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 This is NOT an athletic injury occurring associated with an interscholastic or organized sports team. 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 Obtained custom knee brace but does not believe it fits properly - slides down. Did have [employer] rep take a look at the brace who believed it was fitting properly. Was in PT 2x/week then insurance stopped approving visits shortly after his last visit 6/12/25. Joined a gym which has helped with increasing his strength. Started jogging 1.5 weeks ago and reports increased crunching and anterior knee soreness. On 7/5/25 he rolled his left ankle so all of his weight went on the right knee and he felt something in his right knee tear. Returned to work 6/16/25.  Additional Information: s/p right knee EUA arthroscopic assisted ACLR c quad auto c IB, ALL repair, PLM, synovectomy and excision of plica.  vascular malformation right inner thigh - two sclerotherapy surgeries at [facility] last one 2023